# Patient Record
(demographics unavailable — no encounter records)

---

## 2017-01-26 NOTE — ERD
ER Documentation


Chief Complaint


Date/Time


DATE: 1/26/17 


TIME: 00:38


Chief Complaint


periumbilical pain radaiting to back x 2 days





HPI


40-year-old male presents to emergency department for complaints on 

periumbilical area into the back or 2 days, patient was seen here recently for 

the same problem. Patient describes the pain as sharp pain, 8/10 scale, 

radiates from periumbilical to flank area. Patient denies any nausea or 

vomiting. Patient denies any diarrhea. Patient denies hematuria or dysuria. 

Patient denies any fever or chills.





ROS


All systems reviewed and are negative except as per history of present illness.





Medications


Home Meds


Active Scripts


Ondansetron (Ondansetron Odt) 4 Mg Tab.rapdis, 4 MG PO Q8 Y for NAUSEA AND/OR 

VOMITING, #30 TAB


   Prov:FADI SMITH NP         1/26/17


Phenazopyridine Hcl* (Pyridium*) 200 Mg Tab, 200 MG PO TID Y for URINARY PAIN, #

6 TAB


   Prov:FADI SMITH NP         1/26/17


Hydrocodone/Acetaminophen (Norco 5-325 Tablet) 1 Each Tablet, 1 TAB PO Q6H Y 

for PAIN, #20 TAB


   Prov:FADI SMITH NP         1/26/17


Ondansetron (Ondansetron Odt) 4 Mg Tab.rapdis, 4 MG PO Q6H Y for NAUSEA AND/OR 

VOMITING, #10 TAB


   Prov:CHIN MENDOZA MD         1/23/17


Pantoprazole* (Protonix*) 40 Mg Tablet., 40 MG PO DAILY, #20 TAB


   Prov:CHIN MENDOZA MD         1/23/17


Discontinued Reported Medications


Omeprazole* (Omeprazole*) 20 Mg Capsule.dr, 20 MG PO DAILY, CAP


   5/12/15


Discontinued Scripts


Ondansetron (Ondansetron Odt) 4 Mg Tab.rapdis, 4 MG PO Q6H Y for NAUSEA AND/OR 

VOMITING, #10 TAB


   Prov:SHELLY WREN         12/5/16


Hydrocodone/Acetaminophen (Norco  Tablet) 1 Each Tablet, 1 TAB PO Q6H Y 

for PAIN, #7 TAB


   Prov:SHELLY WREN         12/5/16


Sucralfate* (Carafate*) 1 Gm Tab, 1 GM PO QID, #60 TAB


   Prov:SHELLY WREN         12/5/16


Ondansetron Hcl* (Zofran*) 4 Mg Tablet, 4 MG PO Q6H for NAUSEA AND/OR VOMITING, 

#10 TAB


   Prov:SHAKILA SHIPLEY NP         10/22/16


Famotidine* (Pepcid*) 20 Mg Tablet, 20 MG PO BID for 7 Days, TAB


   Prov:SHAKILA SHIPLEY NP         10/22/16


Ondansetron Hcl* (Zofran* ODT) 4 mg -ODT Tab.disper, 4 MG PO Q8 Y for NAUSEA AND

/OR VOMITING, #30 TAB


   Prov:FADI SMITH NP         9/9/15


Dicyclomine Hcl* (Bentyl*) 20 Mg Tablet, 20 MG PO QID, #20 TAB


   Prov:FADI SMITH NP         9/9/15


Ondansetron Hcl* (Zofran* ODT) 4 mg -ODT Tab.disper, 4 MG PO Q6 Y for NAUSEA AND

/OR VOMITING, #30 TAB


   Prov:ADIA SANZ MD         8/17/15


Hydrocodone Bit-Acetaminophen* (Norco*)  Mg Tablet, 1 TAB PO Q6 Y for PAIN

, #7 TAB


   Prov:ADIA SANZ MD         8/17/15


Ibuprofen* (Motrin*) 600 Mg Tab, 600 MG PO Q6H Y for PAIN AND OR ELEVATED TEMP, 

#30


   Prov:ADIA SANZ MD         8/17/15





Allergies


Allergies:  


Coded Allergies:  


     sulfamethoxazole (Verified  Allergy, Mild, HIVES, 1/23/17)


     trimethoprim (Verified  Allergy, Mild, HIVES, 1/23/17)





PMhx/Soc


History of Surgery:  No


Anesthesia Reaction:  No


Hx Neurological Disorder:  No


Hx Respiratory Disorders:  No


Hx Cardiac Disorders:  No


Hx Psychiatric Problems:  No


Hx Miscellaneous Medical Probl:  Yes (GERD, ALCOHOL ABUSE)


Hx Alcohol Use:  Yes


Hx Substance Use:  No


Hx Tobacco Use:  Yes





FmHx


Family History:  No coronary disease, No diabetes, No other





Physical Exam


Vitals





Vital Signs








  Date Time  Temp Pulse Resp B/P Pulse Ox O2 Delivery O2 Flow Rate FiO2


 


1/26/17 03:27 98.3 77 18 131/77 99 Room Air  


 


1/25/17 23:29 98.3 108 20 157/80 100   








Physical Exam


GENERAL:  The patient is well developed and appropriate for usual state of 

health, in no apparent distress.


CHEST:  Clear to auscultation bilaterally. There are no rales, wheezes or 

rhonchi. 


HEART:  Regular rate and rhythm. No murmurs, clicks, rubs or gallops. No S3 or 

S4.


ABDOMEN:  Soft, nontender and nondistended. Good bowel sounds. No rebound or 

guarding. No gross peritonitis. No gross organomegaly or masses. No Castellanos sign 

or McBurney point tenderness.


BACK:  No midline or flank tenderness.


EXTREMITIES:  Equal pulses bilaterally. There is no peripheral clubbing, 

cyanosis or edema. No focal swelling or erythema. Full range of motion. Grossly 

neurovascularly intact.


NEURO:  Alert and oriented. Cranial nerves 2-12 intact. Motor strength in all 4 

extremities with 5/5 strength.  Sensation grossly intact. Normal speech and 

gait. 


SKIN:  There is no apparent rash or petechia. The skin is warm and dry.


HEMATOLOGIC AND LYMPHATIC:  There is no evidence of excessive bruising or 

lymphedema. No gross cervical, axillary, or inguinal lymphadenopathy.


Result Diagram:  


1/26/17 0055 1/26/17 0055





Results 24 hrs





 Laboratory Tests








Test


  1/26/17


00:55


 


Alanine Aminotransferase


(ALT/SGPT) 30IU/L 


 


 


Albumin 4.2g/dl 


 


Albumin/Globulin Ratio 1.23 


 


Alkaline Phosphatase 111IU/L 


 


Anion Gap 17 


 


Aspartate Amino Transf


(AST/SGOT) 25IU/L 


 


 


Basophils # 0.010^3/ul 


 


Basophils % 0.4% 


 


Blood Urea Nitrogen 3mg/dl 


 


Calcium Level 8.8mg/dl 


 


Carbon Dioxide Level 27mmol/L 


 


Chloride Level 101mmol/L 


 


Creatinine 0.65mg/dl 


 


Direct Bilirubin 0.00mg/dl 


 


Eosinophils # 0.310^3/ul 


 


Eosinophils % 3.6% 


 


Globulin 3.40g/dl 


 


Glucose Level 97mg/dl 


 


Hematocrit 43.9% 


 


Hemoglobin 15.1g/dl 


 


Indirect Bilirubin 0.7mg/dl 


 


Lipase 36U/L 


 


Lymphocytes # 3.110^3/ul 


 


Lymphocytes % 32.6% 


 


Mean Corpuscular Hemoglobin 30.6pg 


 


Mean Corpuscular Hemoglobin


Concent 34.4g/dl 


 


 


Mean Corpuscular Volume 89.0fl 


 


Mean Platelet Volume 10.6fl 


 


Monocytes # 0.610^3/ul 


 


Monocytes % 6.8% 


 


Neutrophils # 5.310^3/ul 


 


Neutrophils % 56.3% 


 


Nucleated Red Blood Cells # 0.010^3/ul 


 


Nucleated Red Blood Cells % 0.0/100WBC 


 


Platelet Count 45383^3/UL 


 


Potassium Level 3.5mmol/L 


 


Red Blood Count 4.9310^6/ul 


 


Red Cell Distribution Width 12.0% 


 


Sodium Level 141mmol/L 


 


Total Bilirubin 0.7mg/dl 


 


Total Protein 7.6g/dl 


 


Urine Bilirubin NEGATIVE 


 


Urine Clarity CLEAR 


 


Urine Color LT. YELLOW 


 


Urine Glucose NEGATIVE% 


 


Urine Hemoglobin TRACE 


 


Urine Ketones NEGATIVE 


 


Urine Leukocyte Esterase NEGATIVE 


 


Urine Microscopic RBC 0-2/HPF 


 


Urine Microscopic WBC 0-2/HPF 


 


Urine Nitrite NEGATIVE 


 


Urine Specific Gravity <=1.005 


 


Urine Squamous Epithelial


Cells OCCASIONAL 


 


 


Urine Total Protein NEGATIVE 


 


Urine Urobilinogen 0.2  E.U./dL 


 


Urine pH 6.0 


 


White Blood Count 9.510^3/ul 








 Current Medications








 Medications


  (Trade)  Dose


 Ordered  Sig/Dejon


 Route


 PRN Reason  Start Time


 Stop Time Status Last Admin


Dose Admin


 


 Sodium Chloride


  (NS)  1,000 ml @ 


 1,000 mls/hr  Q1H STAT


 IV


   1/26/17 00:16


 1/26/17 01:15 DC 1/26/17 01:12


 


 


 Morphine Sulfate


  (morphine)  4 mg  ONCE  STAT


 IV


   1/26/17 00:16


 1/26/17 00:17 DC 1/26/17 01:12


 


 


 Ondansetron HCl 4


 mg  4 mg  ONCE  STAT


 IV


   1/26/17 00:16


 1/26/17 00:17 DC 1/26/17 01:12


 


 


 Sodium Chloride


  (NS)  100 ml @ ud  STK-MED ONCE


 .ROUTE


   1/26/17 01:23


 1/26/17 01:24 DC 1/26/17 01:39


 


 


 Iohexol


  (Omnipaque 300mg/


 ml)  150 ml  STK-MED ONCE


 .ROUTE


   1/26/17 01:23


 1/26/17 01:24 DC 1/26/17 01:39


 


 


 Ketorolac


 Tromethamine


  (Toradol)  30 mg  ONCE  STAT


 IV


   1/26/17 02:38


 1/26/17 02:39 DC 1/26/17 02:46


 





Patient was given medication for pain here in emergency department, after 

treatment, patient verbalized feeling much better. Patient's pain is 

improved.Patient was given Zofran here in the emergency department. After 

treatment, patient was able to tolerate po fluids here in the emergency 

department without any vomiting. There is no signs and symptoms of dehydration. 


Normal saline IV bolus was given here in emergency department for rehydration, 

patient tolerated IV fluids.





PROCEDURE:    CT ABDOMEN/PELVIS WITH CONTRAST  


 


CLINICAL INDICATION:   40-year-old male with abdominal pain. 


 


TECHNIQUE:   The study was performed utilizing a [employer] VCT 64-slice CT 

scanner.  Direct axial sections were obtained through the abdomen and pelvis 

with the use of 100 cc of Omnipaque-300 nonionic intravenous contrast material. 

Sagittal and coronal reformations were obtained. Automated exposure control and 

iterative reconstruction techniques were utilized for this examination.  The 

images were reviewed on a PACS workstation.  CTD/vol = 8.3 mGy; Total Exam DLP 

= 522.5 mGy-cm. 


 


COMPARISON:    CT abdomen/pelvis January 23, 2017. 


 


FINDINGS:


 


The lung bases are unremarkable.  There is no evidence for significant pleural 

effusion.  The liver has a normal size and contour without focal areas of 

abnormal density or contrast enhancement. No intrahepatic nor extrahepatic 

biliary ductal dilatation is seen. The gallbladder demonstrates no wall 

thickening nor pericholecystic fluid. No biliary stones are evident. The 

pancreas is without areas of abnormal attenuation or contrast enhancement.  

This spleen is identified and has a normal size without abnormal density or 

contrast enhancement. The adrenal glands are unremarkable. The kidneys are 

functional bilaterally without focal abnormal density. There is mild bilateral 

prominence of the renal collecting system with perinephric infiltration but 

without evidence for lila obstructive uropathy or nephroureterolithiasis. The 

urinary bladder contains urine. There is a small umbilical hernia with an 

opening of 9 x 10 mm containing fat.  There is no evidence for bowel 

obstruction. There are 


a few small diverticula within the sigmoid colon without surrounding 

inflammatory changes. The appendix is visualized and is without edema or 

surrounding inflammatory reaction. There is no significant free fluid.  The 

aortoiliac vessels are without aneurysmal dilatation. The osseous structures 

are intact. 


 


IMPRESSION:


 


1.  Mild prominence of the renal collecting system with perinephric 

infiltration but without lila obstructive uropathy or nephroureterolithiasis.


2.  No CT evidence for appendicitis.


3.  Minimal sigmoid diverticulosis.


_____________________________________________ 


.Carlos Smith MD, MD           Date    Time 


Electronically viewed and signed by .Carlos Smith MD, MD on 01/26/2017 02:26 


 


D:  01/26/2017 02:26  T:  01/26/2017 02:26


.M/





CC: FADI SMITH NP





Procedures/MDM


Medical Decision Making:  Patient's flank pain abdominal pain nonspecific at 

this time, possibly passed a stone. There is low suspicion for abdominal 

emergencies at this time. Patients abdominal exam is normal at this time. 

Patients radiology exam does not show any abdominal emergencies at this time. 

There is low suspicion for appendicitis, cholecystitis, abdominal aortic 

aneurysms or peritonitis at this time.  There is low suspicion for sepsis. 

Patient appears well and is hemodynamically stable.





Disposition: Home. Condition: Stable


Prescription Norco, Zofran, Pyridium


Instructions: Patient is advised to take medications as prescribed. Patient is 

advised to rest, increase fluid intake and do brat diet for next 1-2 days and 

progress as tolerated. Patient is advised that if symptoms are worse, severe 

abdominal pain, uncontrolled vomiting, high fever, severe flank pain, worst 

signs and symptoms, to return to the emergency department immediately.  

Otherwise, patient can follow up with primary care doctor in 5-7 days.





Departure


Diagnosis:  


 Primary Impression:  


 Abdominal pain


 Abdominal location:  periumbilical  Qualified Code:  R10.33 - Periumbilical 

abdominal pain


Condition:  Stable


Patient Instructions:  Abdominal Pain





Additional Instructions:  


 Patient is advised to take medications as prescribed. Patient is advised to 

rest, increase fluid intake and do brat diet for next 1-2 days and progress as 

tolerated. Patient is advised that if symptoms are worse, severe abdominal pain

, uncontrolled vomiting, high fever, severe flank pain, worst signs and symptoms

, to return to the emergency department immediately.  Otherwise, patient can 

follow up with primary care doctor in 5-7 days.











FADI SMITH NP Jan 26, 2017 00:40

## 2017-01-26 NOTE — RADRPT
PROCEDURE:    CT ABDOMEN/PELVIS WITH CONTRAST  

 

CLINICAL INDICATION:   40-year-old male with abdominal pain. 

 

TECHNIQUE:   The study was performed utilizing a GE [employer] VCT 64-slice CT scanner.  Direct axia
l sections were obtained through the abdomen and pelvis with the use of 100 cc of Omnipaque-300 zulema
onic intravenous contrast material. Sagittal and coronal reformations were obtained. Automated expos
ure control and iterative reconstruction techniques were utilized for this examination.  The images 
were reviewed on a PACS workstation.  CTD/vol = 8.3 mGy; Total Exam DLP = 522.5 mGy-cm. 

 

COMPARISON:    CT abdomen/pelvis January 23, 2017. 

 

FINDINGS:

 

The lung bases are unremarkable.  There is no evidence for significant pleural effusion.  The liver 
has a normal size and contour without focal areas of abnormal density or contrast enhancement. No in
trahepatic nor extrahepatic biliary ductal dilatation is seen. The gallbladder demonstrates no wall 
thickening nor pericholecystic fluid. No biliary stones are evident. The pancreas is without areas o
f abnormal attenuation or contrast enhancement.  This spleen is identified and has a normal size wit
hout abnormal density or contrast enhancement. The adrenal glands are unremarkable. The kidneys are 
functional bilaterally without focal abnormal density. There is mild bilateral prominence of the elmer
al collecting system with perinephric infiltration but without evidence for lila obstructive uropat
hy or nephroureterolithiasis. The urinary bladder contains urine. There is a small umbilical hernia 
with an opening of 9 x 10 mm containing fat.  There is no evidence for bowel obstruction. There are 

a few small diverticula within the sigmoid colon without surrounding inflammatory changes. The appen
sorin is visualized and is without edema or surrounding inflammatory reaction. There is no significant
 free fluid.  The aortoiliac vessels are without aneurysmal dilatation. The osseous structures are i
ntact. 

 

IMPRESSION:

 

1.  Mild prominence of the renal collecting system with perinephric infiltration but without lila o
bstructive uropathy or nephroureterolithiasis.

2.  No CT evidence for appendicitis.

3.  Minimal sigmoid diverticulosis.

_____________________________________________ 

.Carlos Smith MD, MD           Date    Time 

Electronically viewed and signed by .Carlos Smith MD, MD on 01/26/2017 02:26 

 

D:  01/26/2017 02:26  T:  01/26/2017 02:26

.JAYDEN

## 2017-01-28 NOTE — RADRPT
PROCEDURE:   Renal US. 

 

CLINICAL INDICATION:   Flank pain.

 

TECHNIQUE:   Multiple sonographic images of the kidneys were obtained.  

 

COMPARISON:   No prior studies are submitted for comparison. 

 

FINDINGS:

 

The right kidney measures 11.2 x 4.4 x 6.1 cm. There is preservation of corticomedullary differentia
tion. No shadowing renal calculus is identified. There is no evidence of hydronephrosis.  

 

The left kidney measures 13 x 6.8 x 5.4 cm. There is preservation of corticomedullary differentiatio
n. No shadowing renal calculus is identified. There is no evidence of hydronephrosis.  

 

The bladder is fluid-filled.

 

The visualized aorta is within normal limits. The visualized portions of the IVC are within normal l
imits.

 

IMPRESSION:

 

No evidence of shadowing renal calculi or hydronephrosis.

 

RPTAT: HIKT

_____________________________________________ 

.Tyrell Menjivar MD, MD           Date    Time 

Electronically viewed and signed by .Tyrell Menjivar MD, MD on 01/28/2017 21:49 

 

D:  01/28/2017 21:49  T:  01/28/2017 21:49

.T/

## 2017-01-29 NOTE — ERD
ER Documentation


Chief Complaint


Date/Time


DATE: 1/29/17 


TIME: 03:27


Chief Complaint


RLQ ab pain radiating to the R flank all day today





HPI


40-year-old male complaining of bilateral flank pain and mid abdominal pain.  

He reports nausea and dysuria.  This is the third time patient is seen here in 

the last 5 days for the same complaints.  Denies fever or chills.  Denies 

hematuria.  Denies vomiting or diarrhea.





ROS


All systems reviewed and are negative except as per history of present illness.





Medications


Home Meds


Active Scripts


Ibuprofen* (Motrin*) 800 Mg Tab, 800 MG PO Q6H Y for PAIN AND OR ELEVATED TEMP, 

#30 TAB


   Prov:CANDICE MARTINEZ NP         1/28/17


Ondansetron (Ondansetron Odt) 4 Mg Tab.rapdis, 4 MG PO Q8 Y for NAUSEA AND/OR 

VOMITING, #30 TAB


   Prov:FADI SMITH NP         1/26/17


Phenazopyridine Hcl* (Pyridium*) 200 Mg Tab, 200 MG PO TID Y for URINARY PAIN, #

6 TAB


   Prov:FADI SMITH NP         1/26/17


Hydrocodone/Acetaminophen (Norco 5-325 Tablet) 1 Each Tablet, 1 TAB PO Q6H Y 

for PAIN, #20 TAB


   Prov:FADI SMITH NP         1/26/17


Ondansetron (Ondansetron Odt) 4 Mg Tab.rapdis, 4 MG PO Q6H Y for NAUSEA AND/OR 

VOMITING, #10 TAB


   Prov:CHIN MENDOZA MD         1/23/17


Pantoprazole* (Protonix*) 40 Mg Tablet., 40 MG PO DAILY, #20 TAB


   Prov:CHIN MENDOZA MD         1/23/17


Discontinued Reported Medications


Omeprazole* (Omeprazole*) 20 Mg Capsule., 20 MG PO DAILY, CAP


   5/12/15


Discontinued Scripts


Ondansetron (Ondansetron Odt) 4 Mg Tab.rapdis, 4 MG PO Q6H Y for NAUSEA AND/OR 

VOMITING, #10 TAB


   Prov:SHELLY WREN         12/5/16


Hydrocodone/Acetaminophen (Norco  Tablet) 1 Each Tablet, 1 TAB PO Q6H Y 

for PAIN, #7 TAB


   Prov:SHELLY WREN S.         12/5/16


Sucralfate* (Carafate*) 1 Gm Tab, 1 GM PO QID, #60 TAB


   Prov:SHELLY WRENTeresa         12/5/16


Ondansetron Hcl* (Zofran*) 4 Mg Tablet, 4 MG PO Q6H for NAUSEA AND/OR VOMITING, 

#10 TAB


   Prov:SHAKILA SHIPLEY NP         10/22/16


Famotidine* (Pepcid*) 20 Mg Tablet, 20 MG PO BID for 7 Days, TAB


   Prov:SHAKILA SHILPEY NP         10/22/16


Ondansetron Hcl* (Zofran* ODT) 4 mg -ODT Tab.disper, 4 MG PO Q8 Y for NAUSEA AND

/OR VOMITING, #30 TAB


   Prov:FADI SMITH NP         9/9/15


Dicyclomine Hcl* (Bentyl*) 20 Mg Tablet, 20 MG PO QID, #20 TAB


   Prov:FADI SMITH NP         9/9/15


Ondansetron Hcl* (Zofran* ODT) 4 mg -ODT Tab.disper, 4 MG PO Q6 Y for NAUSEA AND

/OR VOMITING, #30 TAB


   Prov:ADIA SANZ MD         8/17/15


Hydrocodone Bit-Acetaminophen* (Norco*)  Mg Tablet, 1 TAB PO Q6 Y for PAIN

, #7 TAB


   Prov:ADIA SANZ MD         8/17/15


Ibuprofen* (Motrin*) 600 Mg Tab, 600 MG PO Q6H Y for PAIN AND OR ELEVATED TEMP, 

#30


   Prov:ADIA SANZ MD         8/17/15





Allergies


Allergies:  


Coded Allergies:  


     sulfamethoxazole (Verified  Allergy, Mild, HIVES, 1/23/17)


     trimethoprim (Verified  Allergy, Mild, HIVES, 1/23/17)





PMhx/Soc


History of Surgery:  No


Anesthesia Reaction:  No


Hx Neurological Disorder:  No


Hx Respiratory Disorders:  No


Hx Cardiac Disorders:  No


Hx Psychiatric Problems:  No


Hx Miscellaneous Medical Probl:  Yes (GERD, ALCOHOL ABUSE)


Hx Alcohol Use:  Yes (socially)


Hx Substance Use:  No


Hx Tobacco Use:  Yes


Smoking Status:  Current every day smoker





Physical Exam


Vitals





Vital Signs








  Date Time  Temp Pulse Resp B/P Pulse Ox O2 Delivery O2 Flow Rate FiO2


 


1/28/17 22:18  89 18 133/67 99 Room Air  


 


1/28/17 18:10 99.3 92 18 132/82 99   








Physical Exam


General impression:  Well-developed, well-nourished.  Alert, oriented, in no 

acute distress


Head:    Normocephalic, atraumatic.


Respiration:    Normal respiratory effort. Lungs clear to auscultate 

bilaterally. No wheezes, rales or rhonchi.


Cardiovascular: Regular rate and rhythm. No murmurs or extra heart sounds.


Abdomen:    Abdomen normal to inspection. Nontender. No masses or organomegaly. 

Bowel sounds normal.


Back:   Normal to inspection. No midline spine tenderness.  Bilateral CVA 

tenderness, with left worse than right.


Neuro:    Mental status normal, speech normal. 


Skin:    Normal turgor. No rash or lesions.


Psych:    Normal mood and affect.


Result Diagram:  


1/28/17 2015 1/28/17 2015





Results 24 hrs





 Laboratory Tests








Test


  1/28/17


20:15 1/28/17


20:40


 


Alanine Aminotransferase


(ALT/SGPT) 27IU/L 


  


 


 


Albumin 3.7g/dl  


 


Albumin/Globulin Ratio 1.19  


 


Alkaline Phosphatase 86IU/L  


 


Anion Gap 19  


 


Aspartate Amino Transf


(AST/SGOT) 26IU/L 


  


 


 


Basophils # 0.010^3/ul  


 


Basophils % 0.3%  


 


Blood Urea Nitrogen 11mg/dl  


 


Calcium Level 8.6mg/dl  


 


Carbon Dioxide Level 28mmol/L  


 


Chloride Level 100mmol/L  


 


Creatinine 1.18mg/dl  


 


Direct Bilirubin 0.00mg/dl  


 


Eosinophils # 0.110^3/ul  


 


Eosinophils % 1.6%  


 


Globulin 3.10g/dl  


 


Glucose Level 160mg/dl  


 


Hematocrit 43.5%  


 


Hemoglobin 15.2g/dl  


 


Indirect Bilirubin 0.3mg/dl  


 


Lymphocytes # 1.810^3/ul  


 


Lymphocytes % 19.4%  


 


Mean Corpuscular Hemoglobin 31.2pg  


 


Mean Corpuscular Hemoglobin


Concent 34.9g/dl 


  


 


 


Mean Corpuscular Volume 89.3fl  


 


Mean Platelet Volume 8.7fl  


 


Monocytes # 0.510^3/ul  


 


Monocytes % 5.1%  


 


Neutrophils # 6.810^3/ul  


 


Neutrophils % 73.6%  


 


Nucleated Red Blood Cells # 0.010^3/ul  


 


Nucleated Red Blood Cells % 0.0/100WBC  


 


Platelet Count 79696^3/UL  


 


Potassium Level 3.8mmol/L  


 


Red Blood Count 4.8710^6/ul  


 


Red Cell Distribution Width 13.1%  


 


Sodium Level 143mmol/L  


 


Total Bilirubin 0.3mg/dl  


 


Total Protein 6.8g/dl  


 


White Blood Count 9.210^3/ul  


 


Urine Bilirubin  NEGATIVE 


 


Urine Clarity  CLEAR 


 


Urine Color  LT. YELLOW 


 


Urine Glucose  NEGATIVE% 


 


Urine Hemoglobin  2+ 


 


Urine Ketones  NEGATIVE 


 


Urine Leukocyte Esterase  NEGATIVE 


 


Urine Microscopic RBC  2-5/HPF 


 


Urine Microscopic WBC  2-5/HPF 


 


Urine Mucus  FEW 


 


Urine Nitrite  NEGATIVE 


 


Urine Specific Gravity  1.020 


 


Urine Squamous Epithelial


Cells 


  FEW 


 


 


Urine Total Protein  2+ 


 


Urine Urobilinogen  0.2  E.U./dL 


 


Urine pH  5.5 








 Current Medications








 Medications


  (Trade)  Dose


 Ordered  Sig/Dejon


 Route


 PRN Reason  Start Time


 Stop Time Status Last Admin


Dose Admin


 


 Ketorolac


 Tromethamine


  (Toradol)  60 mg  ONCE  STAT


 IM


   1/28/17 19:56


 1/28/17 20:00 DC 1/28/17 20:05


 











Procedures/MDM


Well-appearing 40-year-old male presents to ED with bilateral flank pain and 

mid abdominal pain.  He CBC and CMP are unremarkable, as were his 2 previous 

visits.  UA showed 2+ hemoglobin and 2+ protein, otherwise negative.  He has 

received abdominal and pelvic CT and both of his previous visits.  CT showed 

sigmoid diverticulosis without diverticulitis, no evidence of acute 

appendicitis.  The CT on 1/26/2017 also showed mild prominence of the renal 

collecting system with perinephric infiltration but without lila obstruction 

uropathy or nephroureterolithiasis.  Since he has out taking 2 CT scans in the 

last 5 days, I do not feel additional CT scan is indicated today.  Renal 

ultrasound was obtained today which show no evidence of shadowing renal calculi 

or hydronephrosis.





The cause of his flank pain and abdominal pain is uncertain at this time.  He 

does not have urinary tract infection.  Low suspicion for acute appendicitis, 

cholecystitis, or bowel obstruction.  I think most likely explanation of his 

pain is nonobstructing urolithiasis with colic.  Patient was given Toradol IM 

in the ED for pain.  Patient advised to follow-up with his PCP for urology 

referral.  Patient appears well, stable for discharge and outpatient 

management. Medical decision making shared with patient and family. Education 

provided to patient and family. Patient and family expressed understanding of 

the plan.





Medications on discharge: Ibuprofen.


Follow-up: Primary care provider in 2-3 days or return to ED if worse.





Departure


Diagnosis:  


 Primary Impression:  


 Flank pain


 Additional Impression:  


 Abdominal pain


Condition:  Stable


Patient Instructions:  Kidney Stone W/ Colic


Referrals:  


[facility]


Usted se ha hecho un examen mdico de control que le indica que no est en tim 

condicin que requiera tratamiento urgente en el Departamento de Emergencia. Un 

estudio ms profundo y el tratamiento de [place] condicin pueden esperar sin ningn 

riesgo hasta que usted sea atendida/o en el consultorio de [place] mdico o tim cl

mariano. Es responsabilidad suya arreglar tim henry para el seguimiento del colette. 





MANEJO DE CONDICIONES NO URGENTES EN EL FUTURO


1) Si usted tiene un mdico de atencin primaria:





Usted debera llamar a [place] mdico de atencin primaria antes de venir al 

departamento de emergencia. Despus de las horas de consultorio, [place] doctor o [place] 

asociado/a est disponible por telfono. El mdico o enfermero de christ en el 

servicio telefnico puede asesorarle por steven medio para atender el problema, o 

colette contrario se puede programar tim henry.





2) Si usted no tiene un mdico de atencin primaria:


Llame al mdico o clnica de referencia que aparece abajo ariana las horas de 

consultorio para hacer tim henry para que le vean.





CLINICAS:


[facility]  [phone] [address] [facility]  [phone] [address] [facility] [phone] [address] [facility]. [facility]  [phone]


[address] [facility]. [place] [phone] [address] [facility]. [phone] 


[address]





Additional Instructions:  


Llame al doctor MAANA y crystal tim HENRY PARA DENTRO DE 2-3 MATUTE.Dgale a la 

secretaria que nosotros le instruimos hacer esta henry.Avise o llame si [place] 

condicin se empeora antes de la henry. Regresa aqui si peor o no mejor.











CANDICE MARTINEZ. JOSE Jan 29, 2017 03:35

## 2017-03-27 NOTE — RADRPT
PROCEDURE:   XR Chest. 

 

CLINICAL INDICATION:    Chest pain.

 

TECHNIQUE:   Single frontal view  of the chest was obtained 

 

COMPARISON:  Chest x-ray 05/12/2015.

 

FINDINGS:

The soft tissues are normal.  There are degenerative osteophytes in the thoracic spine.  The the hea
rt, cardiomediastinal silhouette and hilar structures are normal. The pulmonary vasculature is mundo
l. There is a left-sided aorta. The lungs are clear.  The costophrenic angles are normal.

 

 

IMPRESSION:

 

1. There is no evidence of active cardiopulmonary disease.  stable chest compared to 05/12/2015.

 

 

RPTAT:AAJJ

_____________________________________________ 

Physician Fransisca           Date    Time 

Electronically viewed and signed by Chang Sherman Physician on 03/27/2017 17:14 

 

D:  03/27/2017 17:14  T:  03/27/2017 17:14

/

## 2017-03-27 NOTE — ERA
ER Documentation


Chief Complaint


Date/Time


DATE: 3/27/17 


TIME: 16:03


Chief Complaint


LEFT CHEST PAIN ONSET THIS MORNING NO DISTRESS. NO SOB.





HPI


The patient is a 40-year-old male, presenting to the ER because of sternal 

chest pain that began about 3 PM, 7/10, he had similar symptoms previously.  

The chest pain is without any radiation.  He denies fever, chills, neck pain, 

chest pain with exertion or vomiting or diaphoresis.  He denies abdominal pain, 

nausea, vomiting, dysuria, diarrhea.  He denies smoking, drinking or using 

illicit drug





Past medical/surgical history: None





ROS


All systems reviewed and are negative except as per history of present illness.





Medications


Home Meds


Active Scripts


Lorazepam* (Ativan*) 0.5 Mg Tablet, 0.5 MG PO Q8 Y for ANXIETY, #6 TAB


   Prov:CHIN MENDOZA MD         3/27/17


Ondansetron (Ondansetron Odt) 4 Mg Tab.rapdis, 4 MG PO Q6H Y for NAUSEA AND/OR 

VOMITING, #10 TAB


   Prov:CHIN MENDOZA MD         3/27/17


Discontinued Scripts


Ibuprofen* (Motrin*) 800 Mg Tab, 800 MG PO Q6H Y for PAIN AND OR ELEVATED TEMP, 

#30 TAB


   Prov:CANDICE MARTINEZ NP         17


Ondansetron (Ondansetron Odt) 4 Mg Tab.rapdis, 4 MG PO Q8 Y for NAUSEA AND/OR 

VOMITING, #30 TAB


   Prov:FADI SMTIH NP         17


Phenazopyridine Hcl* (Pyridium*) 200 Mg Tab, 200 MG PO TID Y for URINARY PAIN, #

6 TAB


   Prov:FADI SMITH NP         17


Hydrocodone/Acetaminophen (Norco 5-325 Tablet) 1 Each Tablet, 1 TAB PO Q6H Y 

for PAIN, #20 TAB


   Prov:FADI SMITH NP         17


Ondansetron (Ondansetron Odt) 4 Mg Tab.rapdis, 4 MG PO Q6H Y for NAUSEA AND/OR 

VOMITING, #10 TAB


   Prov:CHIN MENDOZA MD         17


Pantoprazole* (Protonix*) 40 Mg Tablet.dr, 40 MG PO DAILY, #20 TAB


   Prov:CHIN MENDOZA MD         17





Allergies


Allergies:  


Coded Allergies:  


     sulfamethoxazole (Verified  Allergy, Mild, HIVES, 3/27/17)


     trimethoprim (Verified  Allergy, Mild, HIVES, 3/27/17)





PMhx/Soc


History of Surgery:  No


Anesthesia Reaction:  No


Hx Neurological Disorder:  No


Hx Respiratory Disorders:  No


Hx Cardiac Disorders:  No


Hx Psychiatric Problems:  No


Hx Miscellaneous Medical Probl:  Yes (GERD, ALCOHOL ABUSE)


Hx Alcohol Use:  Yes (socially)


Hx Substance Use:  No


Hx Tobacco Use:  Yes





Physical Exam


Vitals





Vital Signs








  Date Time  Temp Pulse Resp B/P Pulse Ox O2 Delivery O2 Flow Rate FiO2


 


3/27/17 17:56 98.4 113 20 119/86 96 Room Air  


 


3/27/17 15:53 98.0 149 20 131/89 99   








Physical Exam


 Const:      No acute distress.


 Head:        Atraumatic.


 Eyes:       Normal Conjunctiva.


 ENT:         Normal External Ears, Nose and Mouth.


 Neck:        Full range of motion.  No meningismus.


 Resp:         Clear to auscultation bilaterally.


 Cardio:       Regular tachycardic


 Abd:         Soft,  non distended, normal bowel sounds, non tender.


 Skin:         No petechiae or rashes.


 Back:        No midline or flank tenderness.


 Ext:          No cyanosis, or edema.


 Neur:        Awake and alert. No focal deficit


 Psych:        Normal Mood and Affect.


Result Diagram:  


3/27/17 1616                                                                   

             3/27/17 1616





Results 24 hrs





 Laboratory Tests








Test


  3/27/17


16:16 3/27/17


16:24 3/27/17


16:45


 


White Blood Count 7.810^3/ul   


 


Red Blood Count 5.5210^6/ul   


 


Hemoglobin 16.6g/dl   


 


Hematocrit 48.4%   


 


Mean Corpuscular Volume 87.7fl   


 


Mean Corpuscular Hemoglobin 30.1pg   


 


Mean Corpuscular Hemoglobin


Concent 34.3g/dl 


  


  


 


 


Red Cell Distribution Width 12.7%   


 


Platelet Count 28784^3/UL   


 


Mean Platelet Volume 9.9fl   


 


Neutrophils % 50.8%   


 


Lymphocytes % 42.2%   


 


Monocytes % 5.0%   


 


Eosinophils % 0.8%   


 


Basophils % 0.9%   


 


Nucleated Red Blood Cells % 0.0/100WBC   


 


Neutrophils # 4.010^3/ul   


 


Lymphocytes # 3.310^3/ul   


 


Monocytes # 0.410^3/ul   


 


Eosinophils # 0.110^3/ul   


 


Basophils # 0.110^3/ul   


 


Nucleated Red Blood Cells # 0.010^3/ul   


 


Prothrombin Time 12.3Sec   


 


Prothrombin Time Ratio 1.0   


 


INR International Normalized


Ratio 0.91 


  


  


 


 


Activated Partial


Thromboplast Time 26.1Sec 


  


  


 


 


Sodium Level 138mmol/L   


 


Potassium Level 3.6mmol/L   


 


Chloride Level 98mmol/L   


 


Carbon Dioxide Level 24mmol/L   


 


Anion Gap 20   


 


Blood Urea Nitrogen 7mg/dl   


 


Creatinine 0.67mg/dl   


 


Glucose Level 115mg/dl   


 


Calcium Level 9.0mg/dl   


 


Troponin I < 0.012ng/ml   


 


Thyroid Stimulating Hormone


(TSH) 1.250MIU/L 


  


  


 


 


Urine Opiates Screen  Negative  


 


Urine Barbiturates  Negative  


 


Urine Amphetamines Screen  Negative  


 


Urine Benzodiazepines Screen  Negative  


 


Urine Cocaine Screen  Negative  


 


Urine Cannabinoids  Negative  


 


Ethyl Alcohol Level   157.0mg/dl 








 Current Medications








 Medications


  (Trade)  Dose


 Ordered  Sig/Dejon


 Route


 PRN Reason  Start Time


 Stop Time Status Last Admin


Dose Admin


 


 Aspirin


  (Aspirin)  325 mg  ONCE  ONCE


 PO


   3/27/17 17:00


 3/27/17 17:01 DC 3/27/17 17:14


 


 


 Nitroglycerin


  (Nitroglycerin


 2% Oint)  1 inch  ONCE  ONCE


 TD


   3/27/17 17:00


 3/27/17 17:01 DC 3/27/17 16:43


 


 


 Ondansetron HCl


  (Zofran Odt)  4 mg  ONCE  STAT


 ODT


   3/27/17 16:53


 3/27/17 16:54 DC 3/27/17 17:14


 


 


 Lorazepam 1 mg  1 mg  ONCE  ONCE


 IV


   3/27/17 18:00


 3/27/17 18:01 DC 3/27/17 17:49


 


 


 Sodium Chloride


  (NS)  1,000 ml @ 


 1,000 mls/hr  Q1H ONCE


 IV


   3/27/17 18:00


 3/27/17 18:59  3/27/17 18:19


 


 


 Ketorolac


 Tromethamine


  (Toradol)  30 mg  ONCE  STAT


 IV


   3/27/17 17:55


 3/27/17 17:57 DC 3/27/17 18:19


 


 


 Acetaminophen/


 Hydrocodone Bitart


  (Norco (10/325))  1 tab  ONCE  ONCE


 PO


   3/27/17 19:00


 3/27/17 19:01   


 











Procedures/[place] [address]


 Radiology Main Line: [phone]





 DIAGNOSTIC IMAGING REPORT





 Patient: XENIA GAY   : 1977   Age: 40  Sex: M                    

    


 MR #:    [id number]   Acct #:   [id number]    DOS: 17 1603


 Ordering MD: CHIN MENDOZA MD   Location:  E/R   Room/Bed:                  

                          


 








PROCEDURE:   XR Chest. 


 


CLINICAL INDICATION:    Chest pain.


 


TECHNIQUE:   Single frontal view  of the chest was obtained 


 


COMPARISON:  Chest x-ray 2015.


 


FINDINGS:


The soft tissues are normal.  There are degenerative osteophytes in the 

thoracic spine.  The the heart, cardiomediastinal silhouette and hilar 

structures are normal. The pulmonary vasculature is normal. There is a left-

sided aorta. The lungs are clear.  The costophrenic angles are normal.


 


 


IMPRESSION:


 


1. There is no evidence of active cardiopulmonary disease.  stable chest 

compared to 2015.


 


 


RPTAT:AAJJ


_____________________________________________ 


Physician Fransisca           Date    Time 


Electronically viewed and signed by Chang Sherman Physician on 2017 17:14 


 


D:  2017 17:14  T:  2017 17:14


JM/





CC: CHIN MENDOZA MD





EKG:                           Read by emergency physician at 3:58 PM


Rate/Rhythm:          Sinus tachycardia 140 beats/min


QRS, ST, T-waves:    No ST elevation, no T inversion, RWA


Impression:              Abnormal EKG





EKG:                           Read by emergency physician at 4:36 PM


Rate/Rhythm:          Sinus tachycardia 122 beats/min


QRS, ST, T-waves:    No ST elevation, no T inversion, RWA


Impression:              Abnormal EKG





MEDICAL MAKING DECISION: The patient is a 40-year-old male, presenting with 

acute sternal chest, most likely is due to alcohol induced gastritis.  He was 

treated with aspirin 325 mg and 1 inch of nitroglycerin upon arrival to the ER 

because he denies drinking.  He later was treated with Zofran ODT for nausea, 

Ativan 1 mg IV for acute anxiety, 1 L normal saline for acute dehydration, 

Toradol 30 mg IV and Norco 10 mg p.o. for chest discomfort with good response.  

The differential diagnoses considered include but are not limited to [place]-

Parra tear, pancreatitis, esophagitis, acute coronary syndrome, acute 

myocardial infarction, pericarditis, pulmonary embolism, aortic dissection, 

pneumonia, pleural effusion, pneumothorax, GERD, chest wall pain.





Departure


Diagnosis:  


 Primary Impression:  


 Chest pain


 Additional Impression:  


 Alcohol abuse


Condition:  Good


Comments


He was discharged with Zofran and Ativan





I discussed the findings with the patient. I advised the patient to follow-up 

with the primary physician in about 1-2 days, sooner if needed and return if 

any concern.





The patient's blood pressure was elevated (>120/80) but appears stable without 

evidence of hypertension emergency or urgency.  The patient was counseled about 

the risks of hypertension and urged to pursue outpatient monitoring and therapy 

within a week with their primary care physician.











CHIN MENDOZA MD Mar 27, 2017 16:03

## 2017-05-25 NOTE — ERA
ER Documentation


Chief Complaint


Date/Time


DATE: 5/25/17 


TIME: 12:28


Chief Complaint


NAUSEA /VOMITING , EPIGASRIC PAIN  X 5 DAYS





HPI


40-year-old male who presents the emergency room with nausea and vomiting, 

epigastric abdominal pain for several days.  The patient states that he has 

been drinking heavily his last drink was yesterday evening.  The patient denies 

any hematemesis or melena.  He denies any fevers or chills, no fall or head 

injury.  Symptoms are moderate at this time.





ROS


All systems reviewed and are negative except as per history of present illness.





Medications


Home Meds


Active Scripts


Ondansetron (Ondansetron Odt) 4 Mg Tab.rapdis, 4 MG PO Q6H Y for NAUSEA AND/OR 

VOMITING, #10 TAB


   Prov:NIEVES ROSA MD         5/25/17


Discontinued Scripts


Lorazepam* (Ativan*) 0.5 Mg Tablet, 0.5 MG PO Q8 Y for ANXIETY, #6 TAB


   Prov:CHIN MENDOZA MD         3/27/17


Ondansetron (Ondansetron Odt) 4 Mg Tab.rapdis, 4 MG PO Q6H Y for NAUSEA AND/OR 

VOMITING, #10 TAB


   Prov:CHNI MENDOZA MD         3/27/17





Allergies


Allergies:  


Coded Allergies:  


     sulfamethoxazole (Verified  Allergy, Mild, HIVES, 5/25/17)


     trimethoprim (Verified  Allergy, Mild, HIVES, 5/25/17)





PMhx/Soc


History of Surgery:  No


Anesthesia Reaction:  No


Hx Neurological Disorder:  No


Hx Respiratory Disorders:  No


Hx Cardiac Disorders:  No


Hx Psychiatric Problems:  No


Hx Miscellaneous Medical Probl:  Yes (GERD, ALCOHOL ABUSE)


Hx Alcohol Use:  Yes (alcoholic)


Hx Substance Use:  No


Hx Tobacco Use:  Yes (10 cig/ day)


Smoking Status:  Former smoker





FmHx


Family History:  No diabetes





Physical Exam


Vitals





Vital Signs








  Date Time  Temp Pulse Resp B/P Pulse Ox O2 Delivery O2 Flow Rate FiO2


 


5/25/17 10:37 98.4 132 18 145/97 98   








Physical Exam


General: Thin male with slight tremor


Head: Normocephalic, atraumatic.


Eyes: Pupils equally reactive, EOM intact


ENT: Moist mucous membranes


Neck: Supple, no lymphadenopathy


Respiratory: Lungs clear bilaterally, no distress


Cardiovascular: Tachycardia, no murmurs, rubs, or gallops


Abdominal: Soft, non-tender, non-distended, no peritoneal signs, negative 

Castellanos sign


: Deferred


MSK: No edema, no unilateral swelling, 5/5 strength


Neurologic: Alert and oriented, moving all extremities, normal speech, no focal 

weakness, no cerebellar signs


Skin: No rash


Psych: Normal mood


Result Diagram:  


5/25/17 1114                                                                   

             5/25/17 1114





Results 24 hrs





 Laboratory Tests








Test


  5/25/17


11:14


 


White Blood Count 6.810^3/ul 


 


Red Blood Count 5.4310^6/ul 


 


Hemoglobin 16.6g/dl 


 


Hematocrit 47.9% 


 


Mean Corpuscular Volume 88.2fl 


 


Mean Corpuscular Hemoglobin 30.6pg 


 


Mean Corpuscular Hemoglobin


Concent 34.7g/dl 


 


 


Red Cell Distribution Width 12.1% 


 


Platelet Count 82283^3/UL 


 


Mean Platelet Volume 10.0fl 


 


Neutrophils % 67.8% 


 


Lymphocytes % 24.2% 


 


Monocytes % 6.9% 


 


Eosinophils % 0.1% 


 


Basophils % 0.7% 


 


Nucleated Red Blood Cells % 0.0/100WBC 


 


Neutrophils # 4.610^3/ul 


 


Lymphocytes # 1.610^3/ul 


 


Monocytes # 0.510^3/ul 


 


Eosinophils # 0.010^3/ul 


 


Basophils # 0.110^3/ul 


 


Nucleated Red Blood Cells # 0.010^3/ul 


 


Prothrombin Time 13.1Sec 


 


Prothrombin Time Ratio 1.0 


 


INR International Normalized


Ratio 0.99 


 


 


Activated Partial


Thromboplast Time 24.4Sec 


 


 


Sodium Level 130mmol/L 


 


Potassium Level 3.8mmol/L 


 


Chloride Level 90mmol/L 


 


Carbon Dioxide Level 19mmol/L 


 


Anion Gap 25 


 


Blood Urea Nitrogen 5mg/dl 


 


Creatinine 0.68mg/dl 


 


Glucose Level 126mg/dl 


 


Calcium Level 8.8mg/dl 


 


Total Bilirubin 0.6mg/dl 


 


Direct Bilirubin 0.00mg/dl 


 


Indirect Bilirubin 0.6mg/dl 


 


Aspartate Amino Transf


(AST/SGOT) 97IU/L 


 


 


Alanine Aminotransferase


(ALT/SGPT) 93IU/L 


 


 


Alkaline Phosphatase 133IU/L 


 


Total Protein 8.2g/dl 


 


Albumin 4.7g/dl 


 


Globulin 3.50g/dl 


 


Albumin/Globulin Ratio 1.34 


 


Lipase 75U/L 








 Current Medications








 Medications


  (Trade)  Dose


 Ordered  Sig/Dejon


 Route


 PRN Reason  Start Time


 Stop Time Status Last Admin


Dose Admin


 


 Sodium Chloride


  (NS)  1,000 ml @ 


 1,000 mls/hr  Q1H STAT


 IV


   5/25/17 11:09


 5/25/17 12:08 DC 5/25/17 11:33


 


 


 Lorazepam


  (Ativan)  2 mg  ONCE  ONCE


 IV


   5/25/17 11:30


 5/25/17 11:31 DC 5/25/17 11:23


 


 


 Thiamine HCl


  (Vitamin B1)  100 mg  ONCE  ONCE


 PO


   5/25/17 11:30


 5/25/17 11:31 DC 5/25/17 11:56


 


 


 Folic Acid


  (Folic Acid)  1 mg  ONCE  ONCE


 PO


   5/25/17 11:30


 5/25/17 11:31 DC 5/25/17 11:56


 


 


 Ondansetron HCl


  (Zofran Inj)  4 mg  ONCE  STAT


 IV


   5/25/17 11:09


 5/25/17 11:12 DC 5/25/17 11:33


 


 


 Famotidine


  (Pepcid Iv)  20 mg  ONCE  ONCE


 IV


   5/25/17 11:30


 5/25/17 11:31 DC 5/25/17 11:33


 











Procedures/MDM


EKG, MONITORS, & DIAGNOSTIC IMAGING:


EKG #1


EKG: I reviewed and interpreted a 12-lead EKG. 


Rhythm: Sinus tachycardia


Ectopy: None


Intervals: No abnormalities


ST segments: No elevations or depressions


T waves: No contiguous inversions





EKG #2


EKG: I reviewed and interpreted a 12-lead EKG. 


Rhythm: Normal sinus rhythm


Ectopy: None


Intervals: No abnormalities


ST segments: No elevations or depressions


T waves: No contiguous inversions











LAB INTERPRETATION:


No leukocytosis, hyponatremia, borderline alcoholic ketoacidosis with slight 

anion gap acidosis although bicarb greater than 18





MEDICAL DECISION MAKING:


The patient presents with classic signs and symptoms consistent with moderate 

alcohol withdrawal.  The patient is epigastric abdominal pain likely 

significant for alcoholic gastritis though pancreatitis could needs to be ruled 

out.  The patient otherwise has a benign abdominal exam.  No evidence of 

encephalopathy.  No evidence of trauma.  No evidence of complication such as GI 

bleed.





ER COURSE:


The patient was given 1 L of saline.  He was given thiamine, folic acid.  The 

patient's laboratory testing does show early signs of alcoholic ketoacidosis 

though his bicarbonate is greater than 18.  I do not believe he requires 

inpatient hospitalization.  He was given D5 half-normal saline he was given 

complex carbohydrate meal.  The patient received 1 dose of 2 mg Ativan.  The 

patient has significant resolution of his symptoms.  He has no tachycardia no 

tremor no hypertension no altered mental status.  I believe the patient can be 

safely discharged home.  He does not seem to be motivated to stop drinking 

therefore outpatient benzodiazepines are contraindicated.





I kept the patient and/or family informed of laboratory and diagnostic imaging 

results throughout the emergency room course.





DISPOSITION PLAN:


We discussed follow up with the patient's primary care doctor within 24 to 48 

hours as needed.  We also discussed return to the emergency room for worsening 

symptoms or worsening condition.





Outpatient referral: [None required]





Discharge Medications:


Zofran





Departure


Diagnosis:  


 Primary Impression:  


 Nausea and vomiting


 Qualified Code:  R11.2 - Non-intractable vomiting with nausea, unspecified 

vomiting type


 Additional Impressions:  


 Alcohol withdrawal


 Qualified Code:  F10.230 - Alcohol withdrawal, uncomplicated


 Alcoholic ketoacidosis


Condition:  Stable


Patient Instructions:  Nausea and Vomiting-Adult, Alcohol Withdrawal


Referrals:  


[facility]


YOU HAVE RECEIVED A MEDICAL SCREENING EXAM AND THE RESULTS INDICATE THAT YOU DO 

NOT HAVE A CONDITION THAT REQUIRES URGENT TREATMENT IN THE EMERGENCY DEPARTMENT.





FURTHER EVALUATION AND TREATMENT OF YOUR CONDITION CAN WAIT UNTIL YOU ARE SEEN 

IN YOUR DOCTORS OFFICE WITHIN THE NEXT 1-2 DAYS. IT IS YOUR RESPONSIBILITY TO 

MAKE AN APPOINTMENT FOR FOL-UP CARE.





IF YOU HAVE A PRIMARY DOCTOR


--you should call your primary doctor and schedule an appointment





IF YOU DO NOT HAVE A PRIMARY DOCTOR YOU CAN CALL OUR PHYSICIAN REFERRAL HOTLINE 

AT


 [phone] 





IF YOU CAN NOT AFFORD TO SEE A PHYSICIAN YOU CAN CHOSE FROM THE FOLLOWING 

[facility]





[facility] [phone] [address] [facility]. [facility] [phone] [address] [facility]. [facility] [phone] [address] [facility]. [facility] [phone] [address] [facility]. [facility] [phone] [address] [facility]. [facility]. [phone] [address] [facility]. [facility]


YOU HAVE RECEIVED A MEDICAL SCREENING EXAM AND THE RESULTS INDICATE THAT YOU DO 

NOT HAVE A CONDITION THAT REQUIRES URGENT TREATMENT IN THE EMERGENCY DEPARTMENT.





FURTHER EVALUATION AND TREATMENT OF YOUR CONDITION CAN WAIT UNTIL YOU ARE SEEN 

IN YOUR DOCTORS OFFICE WITHIN THE NEXT 1-2 DAYS. IT IS YOUR RESPONSIBILITY TO 

MAKE AN APPOINTMENT FOR FOLOW-UP CARE.





IF YOU HAVE A PRIMARY DOCTOR


--you should call your primary doctor and schedule and appointment





IF YOU DO NOT HAVE A PRIMARY DOCTOR YOU CAN CALL OUR PHYSICIAN REFERRAL HOTLINE 

AT [phone].





IF YOU CAN NOT AFFORD TO SEE A PHYSICIAN YOU CAN CHOSE FROM THE FOLLOWING 

[facility] INSTITUTIONS:





[facility]


[address] [place] [address]





[facility]


[address] [place] [address]





[facility]


[address] [place] [address]





Additional Instructions:  


Call your primary care doctor TOMORROW for an appointment during the next 1 

WEEK.Tell the  that you were referred from this facility.See the 

doctor sooner or return here if your  condition worsens before your appointment 

time.











NIEVES ROSA MD May 25, 2017 12:32